# Patient Record
Sex: FEMALE | Race: WHITE | NOT HISPANIC OR LATINO | Employment: FULL TIME | URBAN - METROPOLITAN AREA
[De-identification: names, ages, dates, MRNs, and addresses within clinical notes are randomized per-mention and may not be internally consistent; named-entity substitution may affect disease eponyms.]

---

## 2019-12-04 NOTE — PROGRESS NOTES
Adult New Patient Visit:  Patient Care Team:  Mary Carbajal MD as PCP - General (Internal Medicine)    Chief Complaint   Patient presents with   • Establish Care       Shauna Medel is a 19 y.o. female who presents to [facility] care. Previous PCP was ***.    HPI Issues discussed today:    1. ADHD:  On Adderall ER 20mg daily and Adderall IR 10mg daily. Per [facility] today 12/5/19, last Rx'd 10/12/19 for Adderral ER #90 tabs by Dr. Cuba in [place] and Adderrall IR 11/25/19 by Dr. Cuba.    Review of Systems     History  Past Medical History:   Diagnosis Date   • ADHD (attention deficit hyperactivity disorder)       History reviewed. No pertinent surgical history.     No Known Allergies     Family History   Problem Relation Age of Onset   • Heart attack Paternal Grandfather         Social History     Socioeconomic History   • Marital status: Single     Spouse name: Not on file   • Number of children: Not on file   • Years of education: Not on file   • Highest education level: Not on file   Tobacco Use   • Smoking status: Never Smoker   • Smokeless tobacco: Never Used   Substance and Sexual Activity   • Alcohol use: No     Frequency: Never   • Drug use: No        Current Outpatient Medications:   •  Amphetamine Sulfate 20 MG tablet dispersible, Take  by mouth., Disp: , Rfl:   •  amphetamine-dextroamphetamine (ADDERALL) 10 MG tablet, Take 10 mg by mouth Daily., Disp: , Rfl:     Health Maintenance   Topic Date Due   • ANNUAL PHYSICAL  06/02/2002   • HPV VACCINES (1 - Female 3-dose series) 06/02/2014   • MENINGOCOCCAL VACCINE (Normal Risk) (1 - 2-dose series) 06/02/2015   • TDAP/TD VACCINES (1 - Tdap) 06/02/2018   • INFLUENZA VACCINE  08/01/2019   • CHLAMYDIA SCREENING  12/04/2019       Immunizations  Td/Tdap(Booster Q 10 yrs):    Flu (Yearly):      There is no immunization history on file for this patient.      Patient's Body mass index is 18.23 kg/m². BMI is {BMI range:84124}.      Colorectal Screening:  " N/A  Pap:  N/A  Mammogram:  N/A  PSA(Over age 50):  N/A  US Aorta (For male smokers, age 65):  N/A  CT for Smoker (Age 55-75, 30pk yr):  N/A  Bone Density/DEXA:  N/A  Hep C ( 4152-0319):  N/A    Vitals:    19 1040   BP: 98/64   Pulse: 97   Resp: 16   Temp: 98.5 °F (36.9 °C)   TempSrc: Temporal   SpO2: 98%   Weight: 48.2 kg (106 lb 3.2 oz)   Height: 162.6 cm (64\")   PainSc: 0-No pain         Physical Exam     Assessment and Plan: 19 y.o. female here to establish care  No problem-specific Assessment & Plan notes found for this encounter.        Healthcare Maintenance:   Counseling provided on {counseling provided:71147}.    No Follow-up on file.    Mary Carbajal MD  2019    "

## 2019-12-05 ENCOUNTER — OFFICE VISIT (OUTPATIENT)
Dept: INTERNAL MEDICINE | Facility: CLINIC | Age: 19
End: 2019-12-05

## 2019-12-05 VITALS
TEMPERATURE: 98.5 F | RESPIRATION RATE: 16 BRPM | BODY MASS INDEX: 18.13 KG/M2 | OXYGEN SATURATION: 98 % | SYSTOLIC BLOOD PRESSURE: 98 MMHG | HEIGHT: 64 IN | DIASTOLIC BLOOD PRESSURE: 64 MMHG | HEART RATE: 97 BPM | WEIGHT: 106.2 LBS

## 2019-12-05 DIAGNOSIS — Z53.21 PATIENT LEFT WITHOUT BEING SEEN: Primary | ICD-10-CM

## 2019-12-05 PROBLEM — F90.9 ATTENTION DEFICIT HYPERACTIVITY DISORDER (ADHD): Status: ACTIVE | Noted: 2019-12-05

## 2019-12-05 RX ORDER — DEXTROAMPHETAMINE SACCHARATE, AMPHETAMINE ASPARTATE, DEXTROAMPHETAMINE SULFATE AND AMPHETAMINE SULFATE 2.5; 2.5; 2.5; 2.5 MG/1; MG/1; MG/1; MG/1
10 TABLET ORAL DAILY
COMMUNITY

## 2019-12-05 NOTE — PROGRESS NOTES
Patient arrived for new patient visit to establish care and for ADHD management. Advised pt that I do not write for adult ADHD medications and typically refer out to Psych for this but would be happy to manage other primary care needs. Pt stated she preferred to have PCP manage this and avoid multiple appt which I respected but advised pt that this would not be a good provider-pt fit then. Pt then preferred to LWBS and not establish care. She was given PCP referral line instead.